# Patient Record
Sex: MALE | URBAN - METROPOLITAN AREA
[De-identification: names, ages, dates, MRNs, and addresses within clinical notes are randomized per-mention and may not be internally consistent; named-entity substitution may affect disease eponyms.]

---

## 2021-01-26 ENCOUNTER — OFFICE VISIT (OUTPATIENT)
Dept: URBAN - METROPOLITAN AREA CLINIC 71 | Facility: CLINIC | Age: 51
End: 2021-01-26
Payer: COMMERCIAL

## 2021-01-26 DIAGNOSIS — H53.2 DIPLOPIA: Primary | ICD-10-CM

## 2021-01-26 PROCEDURE — 99204 OFFICE O/P NEW MOD 45 MIN: CPT | Performed by: OPHTHALMOLOGY

## 2021-01-26 ASSESSMENT — KERATOMETRY
OD: 47.63
OS: 47.63

## 2021-01-26 ASSESSMENT — INTRAOCULAR PRESSURE
OS: 9
OD: 12

## 2021-01-26 NOTE — IMPRESSION/PLAN
Impression: Diplopia: H53.2. 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 Recent CT negative. No optic nerve swelling noted. Consider Prism in glasses in future if symptoms do not improve. If other symptoms occur, recommend MRI with diffusion weighting. Observe for now at this point diplopia is only transient. Should likely go away 1-2 months. Pt to call if other symptoms arise.